# Patient Record
Sex: MALE | Employment: OTHER | URBAN - METROPOLITAN AREA
[De-identification: names, ages, dates, MRNs, and addresses within clinical notes are randomized per-mention and may not be internally consistent; named-entity substitution may affect disease eponyms.]

---

## 2022-06-04 ENCOUNTER — TELEPHONE ENCOUNTER (OUTPATIENT)
Dept: URBAN - METROPOLITAN AREA CLINIC 68 | Facility: CLINIC | Age: 87
End: 2022-06-04

## 2022-06-04 RX ORDER — OMEPRAZOLE 20 MG/1
TABLET, DELAYED RELEASE ORAL DAILY
Qty: 60 | Refills: 0 | OUTPATIENT
Start: 2019-11-01 | End: 2019-12-01

## 2022-06-04 RX ORDER — STOOL SOFTENER 240 MG/1
CAPSULE, LIQUID FILLED ORAL DAILY
Qty: 30 | Refills: 0 | OUTPATIENT
Start: 2019-11-12 | End: 2019-12-12

## 2022-06-04 RX ORDER — POLYETHYLENE GLYCOL 3350 17 G/DOSE
POWDER (GRAM) ORAL AS DIRECTED
Qty: 1 | Refills: 0 | OUTPATIENT
Start: 2019-11-12 | End: 2019-12-12

## 2022-06-04 RX ORDER — LINACLOTIDE 290 UG/1
CAPSULE, GELATIN COATED ORAL DAILY
Qty: 8 | Refills: 0 | OUTPATIENT
Start: 2019-09-26 | End: 2019-10-04

## 2022-06-04 RX ORDER — LINACLOTIDE 145 UG/1
CAPSULE, GELATIN COATED ORAL DAILY
Qty: 8 | Refills: 0 | OUTPATIENT
Start: 2019-09-17 | End: 2019-09-25

## 2022-06-04 RX ORDER — PSYLLIUM HUSK 0.4 G
CAPSULE ORAL DAILY
Qty: 1 | Refills: 0 | OUTPATIENT
Start: 2019-11-15 | End: 2019-12-15

## 2022-06-04 RX ORDER — LUBIPROSTONE 8 UG/1
CAPSULE, GELATIN COATED ORAL
Qty: 14 | Refills: 0 | OUTPATIENT
Start: 2019-10-07 | End: 2019-10-14

## 2022-06-04 RX ORDER — LINACLOTIDE 72 UG/1
CAPSULE, GELATIN COATED ORAL DAILY
Qty: 14 | Refills: 0 | OUTPATIENT
Start: 2019-08-30 | End: 2019-09-13

## 2022-06-05 ENCOUNTER — TELEPHONE ENCOUNTER (OUTPATIENT)
Dept: URBAN - METROPOLITAN AREA CLINIC 68 | Facility: CLINIC | Age: 87
End: 2022-06-05

## 2022-06-05 RX ORDER — MULTIVIT-MIN/FOLIC/VIT K/LYCOP 400-300MCG
VITAMIN C( 1000MG ORAL 1 DAILY ) ACTIVE -HX ENTRY TABLET ORAL DAILY
Status: ACTIVE | COMMUNITY
Start: 2020-03-10

## 2022-06-05 RX ORDER — POLYETHYLENE GLYCOL 3350 17 G/DOSE
MIRALAX(  ORAL  AS NEEDED ) ACTIVE -HX ENTRY POWDER (GRAM) ORAL AS NEEDED
Status: ACTIVE | COMMUNITY
Start: 2020-03-10

## 2022-06-05 RX ORDER — UBIDECARENONE/VIT E ACET 100MG-5
COQ10( 100MG ORAL 1 DAILY ) ACTIVE -HX ENTRY CAPSULE ORAL DAILY
Status: ACTIVE | COMMUNITY
Start: 2020-03-10

## 2022-06-05 RX ORDER — VIT A/VIT C/VIT E/ZINC/COPPER 2148-113
PRESERVISION AREDS(  ORAL 2 DAILY ) ACTIVE -HX ENTRY TABLET ORAL DAILY
Status: ACTIVE | COMMUNITY
Start: 2020-03-10

## 2022-06-05 RX ORDER — ATORVASTATIN CALCIUM 10 MG/1
ATORVASTATIN CALCIUM( 10MG ORAL 1 DAILY ) ACTIVE -HX ENTRY TABLET, FILM COATED ORAL DAILY
Status: ACTIVE | COMMUNITY
Start: 2020-03-10

## 2022-06-05 RX ORDER — ELECTROLYTES/DEXTROSE
MULTIVITAMIN ADULT(  ORAL 1 DAILY ) ACTIVE -HX ENTRY SOLUTION, ORAL ORAL DAILY
Status: ACTIVE | COMMUNITY
Start: 2020-03-10

## 2022-06-05 RX ORDER — BUDESONIDE AND FORMOTEROL FUMARATE DIHYDRATE 160; 4.5 UG/1; UG/1
SYMBICORT( 160-4.5MCG/ACT INHALATION  DAILY ) ACTIVE -HX ENTRY AEROSOL RESPIRATORY (INHALATION) DAILY
Status: ACTIVE | COMMUNITY
Start: 2020-03-10

## 2022-06-05 RX ORDER — DOCUSATE SODIUM 100 MG/1
COLACE( 100MG ORAL 2 DAILY ) ACTIVE -HX ENTRY CAPSULE ORAL DAILY
Status: ACTIVE | COMMUNITY
Start: 2020-03-10

## 2022-06-25 ENCOUNTER — TELEPHONE ENCOUNTER (OUTPATIENT)
Age: 87
End: 2022-06-25

## 2022-06-25 RX ORDER — OMEPRAZOLE 20 MG/1
TABLET, DELAYED RELEASE ORAL DAILY
Qty: 60 | Refills: 0 | OUTPATIENT
Start: 2019-11-01 | End: 2019-12-01

## 2022-06-25 RX ORDER — LINACLOTIDE 72 UG/1
CAPSULE, GELATIN COATED ORAL DAILY
Qty: 14 | Refills: 0 | OUTPATIENT
Start: 2019-08-30 | End: 2019-09-13

## 2022-06-25 RX ORDER — PSYLLIUM HUSK 0.4 G
CAPSULE ORAL DAILY
Qty: 1 | Refills: 0 | OUTPATIENT
Start: 2019-11-15 | End: 2019-12-15

## 2022-06-25 RX ORDER — LINACLOTIDE 145 UG/1
CAPSULE, GELATIN COATED ORAL DAILY
Qty: 8 | Refills: 0 | OUTPATIENT
Start: 2019-09-17 | End: 2019-09-25

## 2022-06-25 RX ORDER — LINACLOTIDE 290 UG/1
CAPSULE, GELATIN COATED ORAL DAILY
Qty: 8 | Refills: 0 | OUTPATIENT
Start: 2019-09-26 | End: 2019-10-04

## 2022-06-25 RX ORDER — STOOL SOFTENER 240 MG/1
CAPSULE, LIQUID FILLED ORAL DAILY
Qty: 30 | Refills: 0 | OUTPATIENT
Start: 2019-11-12 | End: 2019-12-12

## 2022-06-25 RX ORDER — LORATADINE 5 MG
TABLET,CHEWABLE ORAL AS DIRECTED
Qty: 1 | Refills: 0 | OUTPATIENT
Start: 2019-11-12 | End: 2019-12-12

## 2022-06-25 RX ORDER — LUBIPROSTONE 8 UG/1
CAPSULE, GELATIN COATED ORAL
Qty: 14 | Refills: 0 | OUTPATIENT
Start: 2019-10-07 | End: 2019-10-14

## 2022-06-26 ENCOUNTER — TELEPHONE ENCOUNTER (OUTPATIENT)
Age: 87
End: 2022-06-26

## 2022-06-26 RX ORDER — VIT A/VIT C/VIT E/ZINC/COPPER 2148-113
PRESERVISION AREDS(  ORAL 2 DAILY ) ACTIVE -HX ENTRY TABLET ORAL DAILY
Status: ACTIVE | COMMUNITY
Start: 2020-03-10

## 2022-06-26 RX ORDER — LORATADINE 5 MG
MIRALAX(  ORAL  AS NEEDED ) ACTIVE -HX ENTRY TABLET,CHEWABLE ORAL AS NEEDED
Status: ACTIVE | COMMUNITY
Start: 2020-03-10

## 2022-06-26 RX ORDER — ELECTROLYTES/DEXTROSE
MULTIVITAMIN ADULT(  ORAL 1 DAILY ) ACTIVE -HX ENTRY SOLUTION, ORAL ORAL DAILY
Status: ACTIVE | COMMUNITY
Start: 2020-03-10

## 2022-06-26 RX ORDER — ATORVASTATIN CALCIUM 10 MG/1
ATORVASTATIN CALCIUM( 10MG ORAL 1 DAILY ) ACTIVE -HX ENTRY TABLET, FILM COATED ORAL DAILY
Status: ACTIVE | COMMUNITY
Start: 2020-03-10

## 2022-06-26 RX ORDER — DOCUSATE SODIUM 100 MG/1
COLACE( 100MG ORAL 2 DAILY ) ACTIVE -HX ENTRY CAPSULE ORAL DAILY
Status: ACTIVE | COMMUNITY
Start: 2020-03-10

## 2022-06-26 RX ORDER — UBIDECARENONE/VIT E ACET 100MG-5
COQ10( 100MG ORAL 1 DAILY ) ACTIVE -HX ENTRY CAPSULE ORAL DAILY
Status: ACTIVE | COMMUNITY
Start: 2020-03-10

## 2022-06-26 RX ORDER — MULTIVIT-MIN/FOLIC/VIT K/LYCOP 400-300MCG
VITAMIN C( 1000MG ORAL 1 DAILY ) ACTIVE -HX ENTRY TABLET ORAL DAILY
Status: ACTIVE | COMMUNITY
Start: 2020-03-10

## 2023-08-22 ENCOUNTER — OFFICE VISIT (OUTPATIENT)
Dept: URBAN - METROPOLITAN AREA CLINIC 68 | Facility: CLINIC | Age: 88
End: 2023-08-22

## 2023-08-22 RX ORDER — ATORVASTATIN CALCIUM 10 MG/1
ATORVASTATIN CALCIUM( 10MG ORAL 1 DAILY ) ACTIVE -HX ENTRY TABLET, FILM COATED ORAL DAILY
COMMUNITY
Start: 2020-03-10

## 2023-08-22 RX ORDER — DOCUSATE SODIUM 100 MG/1
COLACE( 100MG ORAL 2 DAILY ) ACTIVE -HX ENTRY CAPSULE ORAL DAILY
COMMUNITY
Start: 2020-03-10

## 2023-08-22 RX ORDER — UBIDECARENONE/VIT E ACET 100MG-5
COQ10( 100MG ORAL 1 DAILY ) ACTIVE -HX ENTRY CAPSULE ORAL DAILY
COMMUNITY
Start: 2020-03-10

## 2023-08-22 RX ORDER — MULTIVIT-MIN/FOLIC/VIT K/LYCOP 400-300MCG
VITAMIN C( 1000MG ORAL 1 DAILY ) ACTIVE -HX ENTRY TABLET ORAL DAILY
COMMUNITY
Start: 2020-03-10

## 2023-08-22 RX ORDER — VIT A/VIT C/VIT E/ZINC/COPPER 2148-113
PRESERVISION AREDS(  ORAL 2 DAILY ) ACTIVE -HX ENTRY TABLET ORAL DAILY
COMMUNITY
Start: 2020-03-10

## 2023-08-22 RX ORDER — ELECTROLYTES/DEXTROSE
MULTIVITAMIN ADULT(  ORAL 1 DAILY ) ACTIVE -HX ENTRY SOLUTION, ORAL ORAL DAILY
COMMUNITY
Start: 2020-03-10

## 2023-08-22 RX ORDER — LORATADINE 5 MG
MIRALAX(  ORAL  AS NEEDED ) ACTIVE -HX ENTRY TABLET,CHEWABLE ORAL AS NEEDED
COMMUNITY
Start: 2020-03-10

## 2023-08-22 NOTE — HPI-TODAY'S VISIT:
Patient presents for evaluation of ***. Denies nausea, vomiting, dysphagia, odynophagia, heartburn,  diarrhea, constipation, hematochezia, melena, or weight loss.  Hx SBS vs SBO???

## 2023-08-23 ENCOUNTER — OFFICE VISIT (OUTPATIENT)
Dept: URBAN - METROPOLITAN AREA CLINIC 68 | Facility: CLINIC | Age: 88
End: 2023-08-23
Payer: MEDICARE

## 2023-08-23 VITALS
HEIGHT: 67 IN | WEIGHT: 130 LBS | SYSTOLIC BLOOD PRESSURE: 118 MMHG | DIASTOLIC BLOOD PRESSURE: 72 MMHG | OXYGEN SATURATION: 97 % | BODY MASS INDEX: 20.4 KG/M2 | HEART RATE: 101 BPM

## 2023-08-23 DIAGNOSIS — R10.32 LEFT LOWER QUADRANT ABDOMINAL PAIN: ICD-10-CM

## 2023-08-23 DIAGNOSIS — K59.09 CHRONIC CONSTIPATION: ICD-10-CM

## 2023-08-23 PROBLEM — 301716002: Status: ACTIVE | Noted: 2023-08-23

## 2023-08-23 PROCEDURE — 99204 OFFICE O/P NEW MOD 45 MIN: CPT | Performed by: INTERNAL MEDICINE

## 2023-08-23 RX ORDER — UBIDECARENONE/VIT E ACET 100MG-5
COQ10( 100MG ORAL 1 DAILY ) ACTIVE -HX ENTRY CAPSULE ORAL DAILY
COMMUNITY
Start: 2020-03-10

## 2023-08-23 RX ORDER — ELECTROLYTES/DEXTROSE
MULTIVITAMIN ADULT(  ORAL 1 DAILY ) ACTIVE -HX ENTRY SOLUTION, ORAL ORAL DAILY
COMMUNITY
Start: 2020-03-10

## 2023-08-23 RX ORDER — VIT A/VIT C/VIT E/ZINC/COPPER 2148-113
PRESERVISION AREDS(  ORAL 2 DAILY ) ACTIVE -HX ENTRY TABLET ORAL DAILY
COMMUNITY
Start: 2020-03-10

## 2023-08-23 RX ORDER — ATORVASTATIN CALCIUM 10 MG/1
ATORVASTATIN CALCIUM( 10MG ORAL 1 DAILY ) ACTIVE -HX ENTRY TABLET, FILM COATED ORAL DAILY
COMMUNITY
Start: 2020-03-10

## 2023-08-23 RX ORDER — DOCUSATE SODIUM 100 MG/1
COLACE( 100MG ORAL 2 DAILY ) ACTIVE -HX ENTRY CAPSULE ORAL DAILY
COMMUNITY
Start: 2020-03-10

## 2023-08-23 RX ORDER — MULTIVIT-MIN/FOLIC/VIT K/LYCOP 400-300MCG
VITAMIN C( 1000MG ORAL 1 DAILY ) ACTIVE -HX ENTRY TABLET ORAL DAILY
COMMUNITY
Start: 2020-03-10

## 2023-08-23 RX ORDER — LORATADINE 5 MG
MIRALAX(  ORAL  AS NEEDED ) ACTIVE -HX ENTRY TABLET,CHEWABLE ORAL AS NEEDED
COMMUNITY
Start: 2020-03-10

## 2023-08-23 RX ORDER — CIPROFLOXACIN HYDROCHLORIDE 250 MG/1
1 TABLET TABLET, FILM COATED ORAL
Qty: 14 TABLET | OUTPATIENT
Start: 2023-08-23 | End: 2023-08-30

## 2023-08-23 NOTE — HPI-TODAY'S VISIT:
Patient evaluated due to abdominal pain. Referred having acute onset of LLQ pain with associated constipation.  Denies nausea, vomits, dysphagia, odynophagia, heartburn, diarrhea, constipation GI bleeding  or weight loss

## 2023-08-29 ENCOUNTER — TELEPHONE ENCOUNTER (OUTPATIENT)
Dept: URBAN - METROPOLITAN AREA CLINIC 68 | Facility: CLINIC | Age: 88
End: 2023-08-29

## 2023-09-10 ENCOUNTER — DASHBOARD ENCOUNTERS (OUTPATIENT)
Age: 88
End: 2023-09-10

## 2023-09-11 ENCOUNTER — OFFICE VISIT (OUTPATIENT)
Dept: URBAN - METROPOLITAN AREA CLINIC 68 | Facility: CLINIC | Age: 88
End: 2023-09-11

## 2023-09-11 RX ORDER — VIT A/VIT C/VIT E/ZINC/COPPER 2148-113
PRESERVISION AREDS(  ORAL 2 DAILY ) ACTIVE -HX ENTRY TABLET ORAL DAILY
COMMUNITY
Start: 2020-03-10

## 2023-09-11 RX ORDER — LORATADINE 5 MG
MIRALAX(  ORAL  AS NEEDED ) ACTIVE -HX ENTRY TABLET,CHEWABLE ORAL AS NEEDED
COMMUNITY
Start: 2020-03-10

## 2023-09-11 RX ORDER — MULTIVIT-MIN/FOLIC/VIT K/LYCOP 400-300MCG
VITAMIN C( 1000MG ORAL 1 DAILY ) ACTIVE -HX ENTRY TABLET ORAL DAILY
COMMUNITY
Start: 2020-03-10

## 2023-09-11 RX ORDER — ATORVASTATIN CALCIUM 10 MG/1
ATORVASTATIN CALCIUM( 10MG ORAL 1 DAILY ) ACTIVE -HX ENTRY TABLET, FILM COATED ORAL DAILY
COMMUNITY
Start: 2020-03-10

## 2023-09-11 RX ORDER — DOCUSATE SODIUM 100 MG/1
COLACE( 100MG ORAL 2 DAILY ) ACTIVE -HX ENTRY CAPSULE ORAL DAILY
COMMUNITY
Start: 2020-03-10

## 2023-09-11 RX ORDER — UBIDECARENONE/VIT E ACET 100MG-5
COQ10( 100MG ORAL 1 DAILY ) ACTIVE -HX ENTRY CAPSULE ORAL DAILY
COMMUNITY
Start: 2020-03-10

## 2023-09-11 RX ORDER — ELECTROLYTES/DEXTROSE
MULTIVITAMIN ADULT(  ORAL 1 DAILY ) ACTIVE -HX ENTRY SOLUTION, ORAL ORAL DAILY
COMMUNITY
Start: 2020-03-10